# Patient Record
Sex: FEMALE | Race: WHITE | NOT HISPANIC OR LATINO | Employment: UNEMPLOYED | URBAN - METROPOLITAN AREA
[De-identification: names, ages, dates, MRNs, and addresses within clinical notes are randomized per-mention and may not be internally consistent; named-entity substitution may affect disease eponyms.]

---

## 2023-12-08 ENCOUNTER — OFFICE VISIT (OUTPATIENT)
Dept: URGENT CARE | Facility: CLINIC | Age: 28
End: 2023-12-08

## 2023-12-08 VITALS
OXYGEN SATURATION: 98 % | SYSTOLIC BLOOD PRESSURE: 123 MMHG | WEIGHT: 140 LBS | HEIGHT: 62 IN | RESPIRATION RATE: 16 BRPM | HEART RATE: 79 BPM | BODY MASS INDEX: 25.76 KG/M2 | TEMPERATURE: 98 F | DIASTOLIC BLOOD PRESSURE: 83 MMHG

## 2023-12-08 DIAGNOSIS — R39.89 URINE DISCOLORATION: Primary | ICD-10-CM

## 2023-12-08 PROCEDURE — 99212 OFFICE O/P EST SF 10 MIN: CPT | Mod: 25

## 2023-12-08 NOTE — PROGRESS NOTES
[facility] URGENT CARE [facility] NOTE:      Name: Reggie Spence, 28 y.o.    CSN:[id number]   MRN:[id number]    PCP: No primary care provider on file.    ALL:  No Known Allergies    History:    Chief Complaint: UTI    Encounter Date: 12/8/2023      HPI: The history was obtained from the patient. Reggie is a 28 y.o. female, who presents with a chief complaint of UTI. She states her urine has been darker than normal. She states she has burning with urination every now and then. She states she wants a note to drink cranberry juice since they do not give her any in the [facility]. She states her water intake is poor. She denies fevers, N/V, body aches, pelvic pain, flank pain.     PMHx:    No past medical history on file.           No current outpatient medications on file.     No current facility-administered medications for this visit.         PMSx:  No past surgical history on file.    Fam Hx: No family history on file.    SOC. Hx:     Social History     Socioeconomic History    Marital status: Single     Spouse name: Not on file    Number of children: Not on file    Years of education: Not on file    Highest education level: Not on file   Occupational History    Not on file   Tobacco Use    Smoking status: Not on file    Smokeless tobacco: Not on file   Substance and Sexual Activity    Alcohol use: Not on file    Drug use: Not on file    Sexual activity: Not on file   Other Topics Concern    Not on file   Social History Narrative    Not on file     Social Determinants of Health     Financial Resource Strain: Not on file   Food Insecurity: Not on file   Transportation Needs: Not on file   Physical Activity: Not on file   Stress: Not on file   Social Connections: Not on file   Intimate Partner Violence: Not on file   Housing Stability: Not on file         Vitals:    12/08/23 1214   BP: 123/83   Pulse: 79   Resp: 16   Temp: 36.7 °C (98 °F)   SpO2: 98%     63.5 kg (140 lb)          Physical  Exam  Constitutional:       Appearance: Normal appearance.   HENT:      Right Ear: Tympanic membrane normal.      Left Ear: Tympanic membrane normal.      Nose: Nose normal.      Mouth/Throat:      Mouth: Mucous membranes are moist.      Pharynx: Oropharynx is clear.   Eyes:      Conjunctiva/sclera: Conjunctivae normal.      Pupils: Pupils are equal, round, and reactive to light.   Cardiovascular:      Rate and Rhythm: Normal rate and regular rhythm.   Pulmonary:      Effort: Pulmonary effort is normal.      Breath sounds: Normal breath sounds.   Skin:     General: Skin is warm.   Neurological:      General: No focal deficit present.      Mental Status: She is alert and oriented to person, place, and time.   Psychiatric:         Mood and Affect: Mood normal.         Behavior: Behavior normal.         LABORATORY @ RADIOLOGICAL IMAGING (if done):     No results found for this or any previous visit (from the past 24 hour(s)).     COURSE/MEDICAL DECISION MAKING:    Reggie is a 28 y.o., who presents with a working diagnosis of   1. Urine discoloration      Reggie was seen today for uti.  Diagnoses and all orders for this visit:  Urine discoloration (Primary)    Current plan is to increase water intake. POCT urine negative for UTI. If symptoms persist after 2-3 weeks of increased water intake she should return to provide another urine.     In my medical opinion, I consider Reggie to be low risk for any serious/life-threatening entity, and deem her stable for discharge. This is based on the information that was available to me at the time of this visit.      As we discussed, she is to return to our office or ER immediately if there is any worsening of her condition.        Damari Tomlinson PA-C   Advanced Practice Provider  [facility] URGENT CARE